# Patient Record
Sex: MALE | Race: WHITE | NOT HISPANIC OR LATINO | Employment: FULL TIME | URBAN - METROPOLITAN AREA
[De-identification: names, ages, dates, MRNs, and addresses within clinical notes are randomized per-mention and may not be internally consistent; named-entity substitution may affect disease eponyms.]

---

## 2022-06-28 DIAGNOSIS — M50.21 OTHER CERVICAL DISC DISPLACEMENT, HIGH CERVICAL REGION: ICD-10-CM

## 2022-06-28 DIAGNOSIS — M54.2 CERVICALGIA: Primary | ICD-10-CM

## 2022-06-28 DIAGNOSIS — M25.511 PAIN IN RIGHT SHOULDER: ICD-10-CM

## 2022-07-20 ENCOUNTER — TELEPHONE (OUTPATIENT)
Dept: NEUROSURGERY | Facility: CLINIC | Age: 52
End: 2022-07-20
Payer: COMMERCIAL

## 2022-07-25 ENCOUNTER — TELEPHONE (OUTPATIENT)
Dept: NEUROSURGERY | Facility: CLINIC | Age: 52
End: 2022-07-25
Payer: COMMERCIAL

## 2022-07-25 DIAGNOSIS — M54.2 CERVICALGIA: Primary | ICD-10-CM

## 2022-07-25 NOTE — TELEPHONE ENCOUNTER
Pt called back to sched appt. Pt was sched on 9/1/22 @ 11:00am w/ XR prior. Pt did request to be called if we had a sooner availability.

## 2022-09-01 ENCOUNTER — OFFICE VISIT (OUTPATIENT)
Dept: NEUROSURGERY | Facility: CLINIC | Age: 52
End: 2022-09-01
Payer: COMMERCIAL

## 2022-09-01 ENCOUNTER — HOSPITAL ENCOUNTER (OUTPATIENT)
Dept: RADIOLOGY | Facility: HOSPITAL | Age: 52
Discharge: HOME | End: 2022-09-01
Attending: PHYSICIAN ASSISTANT
Payer: COMMERCIAL

## 2022-09-01 VITALS
HEART RATE: 64 BPM | HEIGHT: 66 IN | DIASTOLIC BLOOD PRESSURE: 84 MMHG | RESPIRATION RATE: 16 BRPM | BODY MASS INDEX: 30.22 KG/M2 | WEIGHT: 188 LBS | SYSTOLIC BLOOD PRESSURE: 118 MMHG

## 2022-09-01 DIAGNOSIS — M54.2 CERVICALGIA: ICD-10-CM

## 2022-09-01 DIAGNOSIS — M25.511 PAIN IN RIGHT SHOULDER: ICD-10-CM

## 2022-09-01 DIAGNOSIS — M47.22 CERVICAL SPONDYLOSIS WITH RADICULOPATHY: Primary | ICD-10-CM

## 2022-09-01 DIAGNOSIS — M50.21 OTHER CERVICAL DISC DISPLACEMENT, HIGH CERVICAL REGION: ICD-10-CM

## 2022-09-01 PROCEDURE — 3008F PR BODY MASS INDEX (BMI) DOCUMENTED: ICD-10-PCS | Mod: CPTII,,, | Performed by: PHYSICIAN ASSISTANT

## 2022-09-01 PROCEDURE — 3079F DIAST BP 80-89 MM HG: CPT | Mod: CPTII,,, | Performed by: PHYSICIAN ASSISTANT

## 2022-09-01 PROCEDURE — 3074F PR MOST RECENT SYSTOLIC BLOOD PRESSURE < 130 MM HG: ICD-10-PCS | Mod: CPTII,,, | Performed by: PHYSICIAN ASSISTANT

## 2022-09-01 PROCEDURE — 1160F RVW MEDS BY RX/DR IN RCRD: CPT | Mod: CPTII,,, | Performed by: PHYSICIAN ASSISTANT

## 2022-09-01 PROCEDURE — 72052 X-RAY EXAM NECK SPINE 6/>VWS: CPT | Mod: TC

## 2022-09-01 PROCEDURE — 99204 OFFICE O/P NEW MOD 45 MIN: CPT | Mod: ,,, | Performed by: PHYSICIAN ASSISTANT

## 2022-09-01 PROCEDURE — 3079F PR MOST RECENT DIASTOLIC BLOOD PRESSURE 80-89 MM HG: ICD-10-PCS | Mod: CPTII,,, | Performed by: PHYSICIAN ASSISTANT

## 2022-09-01 PROCEDURE — 3074F SYST BP LT 130 MM HG: CPT | Mod: CPTII,,, | Performed by: PHYSICIAN ASSISTANT

## 2022-09-01 PROCEDURE — 1160F PR REVIEW ALL MEDS BY PRESCRIBER/CLIN PHARMACIST DOCUMENTED: ICD-10-PCS | Mod: CPTII,,, | Performed by: PHYSICIAN ASSISTANT

## 2022-09-01 PROCEDURE — 1159F PR MEDICATION LIST DOCUMENTED IN MEDICAL RECORD: ICD-10-PCS | Mod: CPTII,,, | Performed by: PHYSICIAN ASSISTANT

## 2022-09-01 PROCEDURE — 99204 PR OFFICE/OUTPT VISIT, NEW, LEVL IV, 45-59 MIN: ICD-10-PCS | Mod: ,,, | Performed by: PHYSICIAN ASSISTANT

## 2022-09-01 PROCEDURE — 3008F BODY MASS INDEX DOCD: CPT | Mod: CPTII,,, | Performed by: PHYSICIAN ASSISTANT

## 2022-09-01 PROCEDURE — 1159F MED LIST DOCD IN RCRD: CPT | Mod: CPTII,,, | Performed by: PHYSICIAN ASSISTANT

## 2022-09-01 RX ORDER — LORATADINE 10 MG/1
10 TABLET ORAL DAILY
COMMUNITY
Start: 2022-07-15

## 2022-09-01 RX ORDER — CYCLOBENZAPRINE HCL 10 MG
10 TABLET ORAL 3 TIMES DAILY PRN
Qty: 30 TABLET | Refills: 2 | Status: SHIPPED | OUTPATIENT
Start: 2022-09-01 | End: 2022-09-11

## 2022-09-01 RX ORDER — ATORVASTATIN CALCIUM 10 MG/1
10 TABLET, FILM COATED ORAL DAILY
COMMUNITY
Start: 2022-08-31

## 2022-09-01 RX ORDER — MELOXICAM 7.5 MG/1
7.5 TABLET ORAL DAILY PRN
COMMUNITY
Start: 2022-05-24

## 2022-09-01 RX ORDER — METHOCARBAMOL 750 MG/1
TABLET, FILM COATED ORAL 3 TIMES DAILY PRN
COMMUNITY
Start: 2022-05-24

## 2022-09-01 RX ORDER — OMEPRAZOLE/SODIUM BICARBONATE 20MG-1.1G
1.1 CAPSULE ORAL DAILY PRN
COMMUNITY

## 2022-09-01 RX ORDER — GABAPENTIN 300 MG/1
CAPSULE ORAL
COMMUNITY
Start: 2022-07-23 | End: 2022-09-01

## 2022-09-01 RX ORDER — MONTELUKAST SODIUM 10 MG/1
10 TABLET ORAL DAILY
COMMUNITY
Start: 2022-08-31

## 2022-09-01 NOTE — PROGRESS NOTES
[facility]  History & Physical  Neurosurgery      Jodee Alaniz   [id number]   1970       CHIEF COMPLAINT:  Neck pain    HPI:  Jodee Alaniz is a 52 y.o. right hand dominant male who presents for neurosurgical evaluation.  He began with TMJ pain bilateral of insidious onset in 6/2022.  Neck pain started soon after.  Initially, he had pain in bilateral shoulders and numbness through both arms.  Those symptoms have resolved.  However, he continues with neck pain, TMJ pain, and headaches.  Pain is increased and most bothersome when sitting.  The pain does not bother him much when he is up and about moving.  Subjectively, he does not have weakness in either upper extremities.  He has used NSAIDs and gabapentin without experiencing a change in his symptoms.  He has not undergone a course of physical therapy.  He denies experiencing difficulty with his balance.  He notes he has dizziness when standing quickly.  He does not have disturbances in bowel or bladder function.      Past Medical History:   Diagnosis Date    Bilateral shoulder pain     Cervical radiculopathy     HLD (hyperlipidemia)     Jaw pain        Past Surgical History:   Procedure Laterality Date    APPENDECTOMY      HERNIA REPAIR         Family History   Problem Relation Age of Onset    Hypertension Mother     Diabetes Maternal Grandmother     Cancer Paternal Grandfather        Social History     Socioeconomic History    Marital status:    Tobacco Use    Smoking status: Never    Smokeless tobacco: Never   Substance and Sexual Activity    Alcohol use: Yes       Current Outpatient Medications   Medication Sig Dispense Refill    atorvastatin (LIPITOR) 10 MG tablet Take 10 mg by mouth once daily.      loratadine (CLARITIN) 10 mg tablet Take 10 mg by mouth once daily.      meloxicam (MOBIC) 7.5 MG tablet Take 7.5 mg by mouth daily as needed.      montelukast (SINGULAIR) 10 mg tablet Take 10 mg by mouth once daily.      multivitamin capsule Take 1  "capsule by mouth once daily.      omeprazole-sodium bicarbonate 20-1.1 mg-gram Cap Take 1.1 g by mouth daily as needed.      methocarbamoL (ROBAXIN) 750 MG Tab 3 (three) times daily as needed.       No current facility-administered medications for this visit.       Review of patient's allergies indicates:   Allergen Reactions    Penicillins Hives        Review of Systems   Constitutional:  Negative for chills and fever.   HENT:  Negative for congestion and hearing loss.    Eyes:  Negative for blurred vision and double vision.   Respiratory:  Negative for cough and wheezing.    Cardiovascular:  Negative for chest pain and palpitations.   Gastrointestinal:  Negative for nausea and vomiting.   Genitourinary:  Negative for urgency.   Musculoskeletal:  Positive for neck pain.   Skin:  Negative for rash.   Neurological:  Positive for headaches. Negative for dizziness, sensory change and weakness.   Psychiatric/Behavioral:  Negative for hallucinations.        Physical Examination:    /84 (BP Location: Other (Comment), Patient Position: Sitting)   Pulse 64   Resp 16   Ht 5' 6" (1.676 m)   Wt 85.3 kg (188 lb)   BMI 30.34 kg/m²       General:  Pleasant. Well-nourished. Well-groomed.    CV:  Neck is supple.  There are no carotid bruits.  Hear has regular rate and rhythm.    Lungs:  clear to auscultation bilaterally    Abdomen:  Soft, non-tender, non-distended    Musculoskeletal:  Cervical ROM: severely limited with extension, moderately limited with bilateral rotation.  Pain is increase in all four directions.  There are severe spasms and moderate tenderness in bilateral trapezius muscles and paraspinal muscles.  Tinel's is negative at bilateral wrist and elbows.  Spurling's maneuver is negative bilaterally.    Neurological:    Oriented to Person, Place, Time   Muscle strength against resistance:  Strength  Deltoids Triceps Biceps Wrist Extension Wrist Flexion Hand    Upper: R 5/5 5/5 5/5 5/5 5/5 5/5    L 5/5 " 5/5 5/5 5/5 5/5 5/5   Sensation is intact to primary modalities in bilateral upper extremities.  Reflexes:   Left Right   Triceps 1+ 1+   Biceps 0 0   Brachioradialis 1+ 1+   Sanon's sign is negative bilaterally.  Gait: normal  Coordination is normal    Imaging:  X-rays of the cervical spine were obtained on 09/01/2022.  There is reverse lordosis centered at C5-6.  There is disc space narrowing and spondylosis at C5-6.  There is no abnormal motion with flexion or extension.  MRI of the cervical spine was obtained on 06/15/2022.  At C4-5, there is mild bilateral foraminal stenosis secondary to uncinate and facet hypertrophy.  At C5-6, there is disc/osteophyte complex with uncinate and facet hypertrophy which causes mild central and severe bilateral foraminal stenosis.  At C6-7, there is disc/osteophyte complex along with uncinate and facet hypertrophy that causes moderate central and severe bilateral foraminal stenosis.      ASSESSMENT/PLAN:     1. Cervical spondylosis with radiculopathy  cyclobenzaprine (FLEXERIL) 10 MG tablet    Ambulatory referral/consult to Physical/Occupational Therapy      Options were discussed at length with the patient.  Although his radiculopathy has resolved, he continues with neck pain and headaches as well as pain at bilateral TMJs.  He has not maximized conservative measures.  We will have him undergo a course of physical therapy.  I have also discussed the possibility of using a splint at night for TMJ pain.  He will return for follow-up in 7-8 weeks.      A total of 31 minutes was spent face-to-face with the patient during this encounter.  Over half of that time was spent on counseling and coordination of care.  Additional time was used to review the patient's chart, cervcial MRI and x-ray images and reports, and work on office note.